# Patient Record
Sex: MALE | Race: OTHER | NOT HISPANIC OR LATINO
[De-identification: names, ages, dates, MRNs, and addresses within clinical notes are randomized per-mention and may not be internally consistent; named-entity substitution may affect disease eponyms.]

---

## 2019-07-11 ENCOUNTER — APPOINTMENT (OUTPATIENT)
Dept: VASCULAR SURGERY | Facility: CLINIC | Age: 79
End: 2019-07-11
Payer: SELF-PAY

## 2019-07-11 VITALS — HEART RATE: 80 BPM | SYSTOLIC BLOOD PRESSURE: 137 MMHG | DIASTOLIC BLOOD PRESSURE: 70 MMHG

## 2019-07-11 DIAGNOSIS — I65.23 OCCLUSION AND STENOSIS OF BILATERAL CAROTID ARTERIES: ICD-10-CM

## 2019-07-11 PROCEDURE — 93880 EXTRACRANIAL BILAT STUDY: CPT

## 2019-07-11 PROCEDURE — 99213 OFFICE O/P EST LOW 20 MIN: CPT

## 2019-07-12 RX ORDER — ROSUVASTATIN CALCIUM 10 MG/1
10 TABLET, FILM COATED ORAL
Refills: 0 | Status: ACTIVE | COMMUNITY

## 2019-07-12 RX ORDER — DAPAGLIFLOZIN 10 MG/1
10 TABLET, FILM COATED ORAL
Refills: 0 | Status: ACTIVE | COMMUNITY

## 2019-07-12 RX ORDER — PRIMIDONE 50 MG/1
50 TABLET ORAL
Refills: 0 | Status: ACTIVE | COMMUNITY

## 2019-07-12 RX ORDER — SITAGLIPTIN AND METFORMIN HYDROCHLORIDE 50; 1000 MG/1; MG/1
50-1000 TABLET, FILM COATED ORAL
Refills: 0 | Status: ACTIVE | COMMUNITY

## 2019-07-12 RX ORDER — DUTASTERIDE 0.5 MG/1
0.5 CAPSULE, LIQUID FILLED ORAL
Refills: 0 | Status: ACTIVE | COMMUNITY

## 2019-07-12 RX ORDER — MULTIVITAMIN
TABLET ORAL
Refills: 0 | Status: ACTIVE | COMMUNITY

## 2019-07-12 RX ORDER — PROPRANOLOL HYDROCHLORIDE 10 MG/1
10 TABLET ORAL
Refills: 0 | Status: ACTIVE | COMMUNITY

## 2019-07-12 NOTE — PHYSICAL EXAM
[Normal Breath Sounds] : Normal breath sounds [Respiratory Effort] : normal respiratory effort [Normal Heart Sounds] : normal heart sounds [2+] : left 2+ [No Rash or Lesion] : No rash or lesion [Alert] : alert [Oriented to Person] : oriented to person [Oriented to Place] : oriented to place [Oriented to Time] : oriented to time [Calm] : calm [JVD] : no jugular venous distention  [Carotid Bruits] : no carotid bruits [Right Carotid Bruit] : no bruit heard over the right carotid [Left Carotid Bruit] : no bruit heard over the left carotid [de-identified] : calm, cooperative [de-identified] : R [de-identified] : R neck incision healed [de-identified] : FROM

## 2019-07-12 NOTE — ASSESSMENT
[Arterial/Venous Disease] : arterial/venous disease [Medication Management] : medication management [FreeTextEntry1] : 78 y/o M with PMH as above and asymptomatic carotid artery disease s/p R CEA back in 2016. Today, he remains asymptomatic. Carotid duplex demonstrates R CEA is patent and L side has <50% stenosis. On exam, he is neurologically intact. No carotid bruits appreciated. /70 and HR 80b/min. Pt advised to follow-up yearly. Also continue Plavix and Crestor. He should stay well hydrated, especially during episodes of GI upset. He was educated on proper blood pressure monitoring and keeping a daily log. Discussed normal/expected SBP range 110-140s and to contact PCP/Cardiologist if SBP is lower or higher persistently despite hydration and/or medications.

## 2019-07-12 NOTE — CONSULT LETTER
[Dear  ___] : Dear  [unfilled], [FreeTextEntry2] : Trey Kelly MD\par [address], [address]\par [place] [id number] [FreeTextEntry1] : I saw Mr Josias Norris for follow-up. He is doing well and remains stable after his right carotid endarterectomy back in 2016.\par \par Attached, you will find his carotid duplex report along with my complete EMR office note for your records.\par \par If you have any questions, please do not hesitate to contact us.  [FreeTextEntry3] : Sincerely, \par \par Cris Purcell, MS, AGACNP-BC\par Nurse Practitioner to Dr. Brandon Pearl\par \par Surgical Services\par [facility]\par [facility]\par , Dept. of Surgery\par [facility] \par [address], [address] [place] [address]\[place] [address]\[facility] Office: [phone]\[facility] Direct Line: [phone]\[facility] Fax: [phone]\[facility] Email: [email or web address]

## 2019-07-12 NOTE — PHYSICAL EXAM
[Normal Breath Sounds] : Normal breath sounds [Respiratory Effort] : normal respiratory effort [Normal Heart Sounds] : normal heart sounds [2+] : left 2+ [No Rash or Lesion] : No rash or lesion [Alert] : alert [Oriented to Person] : oriented to person [Oriented to Place] : oriented to place [Oriented to Time] : oriented to time [Calm] : calm [JVD] : no jugular venous distention  [Carotid Bruits] : no carotid bruits [Right Carotid Bruit] : no bruit heard over the right carotid [Left Carotid Bruit] : no bruit heard over the left carotid [de-identified] : calm, cooperative [de-identified] : R [de-identified] : R neck incision healed [de-identified] : FROM

## 2019-07-12 NOTE — ASSESSMENT
[Arterial/Venous Disease] : arterial/venous disease [Medication Management] : medication management [FreeTextEntry1] : 80 y/o M with PMH as above and asymptomatic carotid artery disease s/p R CEA back in 2016. Today, he remains asymptomatic. Carotid duplex demonstrates R CEA is patent and L side has <50% stenosis. On exam, he is neurologically intact. No carotid bruits appreciated. /70 and HR 80b/min. Pt advised to follow-up yearly. Also continue Plavix and Crestor. He should stay well hydrated, especially during episodes of GI upset. He was educated on proper blood pressure monitoring and keeping a daily log. Discussed normal/expected SBP range 110-140s and to contact PCP/Cardiologist if SBP is lower or higher persistently despite hydration and/or medications.

## 2019-07-12 NOTE — HISTORY OF PRESENT ILLNESS
[FreeTextEntry1] : 80 y/o M with PMH of T2DM, HTN, HLD, CAD s/p 3 stents, cataracts, PNA, hypotension episodes, former smoker, and asymptomatic carotid artery disease s/p R CEA back in 2016. Today, he presents for follow-up. Denies any TIA/CVA symptoms, chest pain, or SOB. He is on daily Plavix and Crestor. Endorses compliance with medications. He does reports concerns of "low blood pressures" while going to the bathroom, especially with episodes of loose stools. He explains a few blood pressure medications and dosages have changes given these episodes and has been advised to stay well hydrated. Furthermore, denies any rest pain or claudication; he stays as active as possible. \par \par Presents accompanied by wife and daughter. He lives in [place] the majority of the year.